# Patient Record
Sex: FEMALE | Race: WHITE | HISPANIC OR LATINO | Employment: PART TIME | URBAN - METROPOLITAN AREA
[De-identification: names, ages, dates, MRNs, and addresses within clinical notes are randomized per-mention and may not be internally consistent; named-entity substitution may affect disease eponyms.]

---

## 2020-04-22 ENCOUNTER — HOSPITAL ENCOUNTER (EMERGENCY)
Facility: HOSPITAL | Age: 20
Discharge: HOME | End: 2020-04-22
Attending: EMERGENCY MEDICINE

## 2020-04-22 VITALS
TEMPERATURE: 99 F | HEART RATE: 79 BPM | WEIGHT: 102.06 LBS | SYSTOLIC BLOOD PRESSURE: 104 MMHG | OXYGEN SATURATION: 94 % | DIASTOLIC BLOOD PRESSURE: 67 MMHG | RESPIRATION RATE: 16 BRPM | HEIGHT: 60 IN | BODY MASS INDEX: 20.04 KG/M2

## 2020-04-22 DIAGNOSIS — N83.201 CYST OF RIGHT OVARY: Primary | ICD-10-CM

## 2020-04-22 LAB
ALBUMIN SERPL BCP-MCNC: 4.5 G/DL (ref 3.5–5.2)
ALP SERPL-CCNC: 68 U/L (ref 55–135)
ALT SERPL W/O P-5'-P-CCNC: 19 U/L (ref 10–44)
ANION GAP SERPL CALC-SCNC: 8 MMOL/L (ref 8–16)
AST SERPL-CCNC: 19 U/L (ref 10–40)
B-HCG UR QL: NEGATIVE
BASOPHILS # BLD AUTO: 0.05 K/UL (ref 0–0.2)
BASOPHILS NFR BLD: 0.7 % (ref 0–1.9)
BILIRUB SERPL-MCNC: 0.3 MG/DL (ref 0.1–1)
BILIRUB UR QL STRIP: NEGATIVE
BUN SERPL-MCNC: 8 MG/DL (ref 6–20)
CALCIUM SERPL-MCNC: 9.3 MG/DL (ref 8.7–10.5)
CHLORIDE SERPL-SCNC: 106 MMOL/L (ref 95–110)
CLARITY UR: CLEAR
CO2 SERPL-SCNC: 23 MMOL/L (ref 23–29)
COLOR UR: YELLOW
CREAT SERPL-MCNC: 0.8 MG/DL (ref 0.5–1.4)
CTP QC/QA: YES
DIFFERENTIAL METHOD: NORMAL
EOSINOPHIL # BLD AUTO: 0.2 K/UL (ref 0–0.5)
EOSINOPHIL NFR BLD: 2.8 % (ref 0–8)
ERYTHROCYTE [DISTWIDTH] IN BLOOD BY AUTOMATED COUNT: 12.2 % (ref 11.5–14.5)
EST. GFR  (AFRICAN AMERICAN): >60 ML/MIN/1.73 M^2
EST. GFR  (NON AFRICAN AMERICAN): >60 ML/MIN/1.73 M^2
GLUCOSE SERPL-MCNC: 99 MG/DL (ref 70–110)
GLUCOSE UR QL STRIP: NEGATIVE
HCT VFR BLD AUTO: 43 % (ref 37–48.5)
HGB BLD-MCNC: 14 G/DL (ref 12–16)
HGB UR QL STRIP: NEGATIVE
IMM GRANULOCYTES # BLD AUTO: 0.01 K/UL (ref 0–0.04)
IMM GRANULOCYTES NFR BLD AUTO: 0.1 % (ref 0–0.5)
KETONES UR QL STRIP: NEGATIVE
LEUKOCYTE ESTERASE UR QL STRIP: NEGATIVE
LYMPHOCYTES # BLD AUTO: 2.2 K/UL (ref 1–4.8)
LYMPHOCYTES NFR BLD: 29.3 % (ref 18–48)
MCH RBC QN AUTO: 30.1 PG (ref 27–31)
MCHC RBC AUTO-ENTMCNC: 32.6 G/DL (ref 32–36)
MCV RBC AUTO: 93 FL (ref 82–98)
MONOCYTES # BLD AUTO: 0.5 K/UL (ref 0.3–1)
MONOCYTES NFR BLD: 6.4 % (ref 4–15)
NEUTROPHILS # BLD AUTO: 4.5 K/UL (ref 1.8–7.7)
NEUTROPHILS NFR BLD: 60.7 % (ref 38–73)
NITRITE UR QL STRIP: NEGATIVE
NRBC BLD-RTO: 0 /100 WBC
PH UR STRIP: 7 [PH] (ref 5–8)
PLATELET # BLD AUTO: 230 K/UL (ref 150–350)
PMV BLD AUTO: 10.5 FL (ref 9.2–12.9)
POTASSIUM SERPL-SCNC: 4 MMOL/L (ref 3.5–5.1)
PROT SERPL-MCNC: 7.5 G/DL (ref 6–8.4)
PROT UR QL STRIP: NEGATIVE
RBC # BLD AUTO: 4.65 M/UL (ref 4–5.4)
SODIUM SERPL-SCNC: 137 MMOL/L (ref 136–145)
SP GR UR STRIP: 1.01 (ref 1–1.03)
URN SPEC COLLECT METH UR: NORMAL
UROBILINOGEN UR STRIP-ACNC: NEGATIVE EU/DL
WBC # BLD AUTO: 7.48 K/UL (ref 3.9–12.7)

## 2020-04-22 PROCEDURE — 80053 COMPREHEN METABOLIC PANEL: CPT

## 2020-04-22 PROCEDURE — 81025 URINE PREGNANCY TEST: CPT | Performed by: EMERGENCY MEDICINE

## 2020-04-22 PROCEDURE — 25500020 PHARM REV CODE 255

## 2020-04-22 PROCEDURE — 81003 URINALYSIS AUTO W/O SCOPE: CPT

## 2020-04-22 PROCEDURE — 99285 EMERGENCY DEPT VISIT HI MDM: CPT | Mod: 25

## 2020-04-22 PROCEDURE — 36415 COLL VENOUS BLD VENIPUNCTURE: CPT

## 2020-04-22 PROCEDURE — 85025 COMPLETE CBC W/AUTO DIFF WBC: CPT

## 2020-04-22 RX ORDER — HYDROCODONE BITARTRATE AND ACETAMINOPHEN 5; 325 MG/1; MG/1
1 TABLET ORAL EVERY 6 HOURS PRN
Qty: 12 TABLET | Refills: 0 | Status: SHIPPED | OUTPATIENT
Start: 2020-04-22 | End: 2020-05-02

## 2020-04-22 RX ADMIN — IOHEXOL 100 ML: 350 INJECTION, SOLUTION INTRAVENOUS at 06:04

## 2020-04-22 NOTE — ED NOTES
Assumed care:  Gabriela Jeffrey is awake, alert and oriented x 3, skin warm and dry, in NAD.  CO RLQ pain that radiates to back and down right leg that started yesterday.  Denies N&V    Patient identifiers for Gabriela Jeffrey checked and correct.  LOC:  Gabriela Jeffrey is awake, alert, and aware of environment with an appropriate affect. She is oriented x 3 and speaking appropriately.  APPEARANCE:  She is resting comfortably and in no acute distress. She is clean and well groomed, patient's clothing is properly fastened.  SKIN:  The skin is warm and dry. She has normal skin turgor and moist mucus membranes. Skin is intact; no bruising or breakdown noted.  MUSCULOSKELETAL:  She is moving all extremities well, no obvious deformities noted. Pulses intact.   RESPIRATORY:  Airway is open and patent. Respirations are spontaneous and non-labored with normal effort and rate.  CARDIAC:  She has a normal rate and rhythm. No peripheral edema noted. Capillary refill < 3 seconds.  ABDOMEN:  No distention noted.  Soft and non-tender upon palpation.  RLQ pain, R flank pain  NEUROLOGICAL:  PERRL. Facial expression is symmetrical. Hand grasps are equal bilaterally. Normal sensation in all extremities when touched with finger.  Allergies reported:  Review of patient's allergies indicates:  No Known Allergies  OTHER NOTES:

## 2020-04-22 NOTE — ED PROVIDER NOTES
Encounter Date: 4/22/2020       History     Chief Complaint   Patient presents with    Flank Pain    Abdominal Pain     19-year-old female with no past medical history presents with a chief complaint of right lower abdominal pain for 1 day.  The patient reports that her symptoms started 1 day ago in her getting progressively worse.  She denies any associated dysuria, hematuria, fever/chills, nausea/vomiting, diarrhea, or hematochezia.  The patient reports that she did have an appetite yesterday but not much of 1 today.  There are no alleviating or aggravating factors.  She has not taken anything for pain relief.  There is no past surgical history.        Review of patient's allergies indicates:  No Known Allergies  History reviewed. No pertinent past medical history.  History reviewed. No pertinent surgical history.  History reviewed. No pertinent family history.  Social History     Tobacco Use    Smoking status: Current Every Day Smoker     Packs/day: 0.50   Substance Use Topics    Alcohol use: Yes     Comment: barely    Drug use: Not Currently     Review of Systems   Constitutional: Negative for chills and fever.   HENT: Negative for congestion.    Respiratory: Negative for cough and shortness of breath.    Cardiovascular: Negative for chest pain.   Gastrointestinal: Positive for abdominal pain. Negative for nausea and vomiting.   Genitourinary: Negative for dysuria.   Musculoskeletal: Negative for gait problem.   Skin: Negative for color change.   Neurological: Negative for dizziness and numbness.   Psychiatric/Behavioral: Negative for agitation.       Physical Exam     Initial Vitals [04/22/20 0558]   BP Pulse Resp Temp SpO2   139/82 84 16 98.9 °F (37.2 °C) 100 %      MAP       --         Physical Exam    Nursing note and vitals reviewed.  Constitutional: She appears well-developed and well-nourished.   HENT:   Head: Normocephalic and atraumatic.   Eyes: EOM are normal. Pupils are equal, round, and reactive  to light.   Neck: Normal range of motion.   Cardiovascular: Normal rate and regular rhythm.   Pulmonary/Chest: Breath sounds normal.   Abdominal: Soft. Bowel sounds are normal. She exhibits no distension and no mass. There is tenderness. There is no rebound and no guarding.   Right lower quadrant abdominal tenderness.  No rebound or guarding noted.   Musculoskeletal: Normal range of motion.        Right shoulder: Normal.        Left shoulder: Normal.   Neurological: She is alert and oriented to person, place, and time.   Skin: Skin is warm and dry.   Psychiatric: She has a normal mood and affect.         ED Course   Procedures  Labs Reviewed   URINALYSIS, REFLEX TO URINE CULTURE   COMPREHENSIVE METABOLIC PANEL   CBC W/ AUTO DIFFERENTIAL   POCT URINE PREGNANCY          Imaging Results    None          Medical Decision Making:   Initial Assessment:   19-year-old female presented with abdominal pain.  Differential Diagnosis:   Initial differential diagnosis included but not limited to appendicitis, cystitis, and viral illness.  Clinical Tests:   Lab Tests: Ordered and Reviewed  Radiological Study: Ordered  ED Management:  The patient was emergently evaluated in the emergency department, her evaluation was significant for a young female with abdominal tenderness.  The patient's labs showed no acute abnormalities.  The patient's CT is pending at the time of shift change.  Her care has been handed over to Dr. Travis at change of shift.                                 Clinical Impression:       ICD-10-CM ICD-9-CM   1. Cyst of right ovary N83.201 620.2                                Jerardo Chou MD  04/25/20 0727